# Patient Record
(demographics unavailable — no encounter records)

---

## 2025-02-13 NOTE — ASSESSMENT
[FreeTextEntry1] : # HM f/u AV labs Overdue for Pap smear, referred to GYN  # Frequent marijuana use Encouraged patient to switch to edible from inhaled  # Enlarged LN Stable, monitor for now  f/u in 1 year or PRN

## 2025-02-13 NOTE — PHYSICAL EXAM
[Normal TMs] : both tympanic membranes were normal [No Lymphadenopathy] : no lymphadenopathy [Supple] : supple [Normal] : normal rate, regular rhythm, normal S1 and S2 and no murmur heard [No Edema] : there was no peripheral edema [No Extremity Clubbing/Cyanosis] : no extremity clubbing/cyanosis [Soft] : abdomen soft [Non Tender] : non-tender [Non-distended] : non-distended [No Masses] : no abdominal mass palpated [Normal Supraclavicular Nodes] : no supraclavicular lymphadenopathy [Normal Anterior Cervical Nodes] : no anterior cervical lymphadenopathy [Normal Gait] : normal gait [Speech Grossly Normal] : speech grossly normal [Normal Affect] : the affect was normal [Alert and Oriented x3] : oriented to person, place, and time [Normal Mood] : the mood was normal [Normal Insight/Judgement] : insight and judgment were intact

## 2025-02-13 NOTE — HEALTH RISK ASSESSMENT
[2 - 4 times a month (2 pts)] : 2-4 times a month (2 points) [1 or 2 (0 pts)] : 1 or 2 (0 points) [Never (0 pts)] : Never (0 points) [Yes] : In the past 12 months have you used drugs other than those required for medical reasons? Yes [0] : 2) Feeling down, depressed, or hopeless: Not at all (0) [PHQ-2 Negative - No further assessment needed] : PHQ-2 Negative - No further assessment needed [Never] : Never [Patient reported PAP Smear was normal] : Patient reported PAP Smear was normal [HIV test declined] : HIV test declined [Hepatitis C test declined] : Hepatitis C test declined [Audit-CScore] : 2 [de-identified] : smokes marijuana, has edibles multiple times per week [de-identified] : exercises regularly, walks or runs a little, elliptical, bike, light weight lifting, beginner yoga [de-identified] : healthy [VBY7Agzzd] : 0 [PapSmearDate] : 02/23

## 2025-02-13 NOTE — HISTORY OF PRESENT ILLNESS
[FreeTextEntry1] : CPE [de-identified] : Pt comes in for CPE. Feeling well, no complaints.  Had enlarged LN of nbeck evaluated by head & neck surgeon last year, looked like reactive LN on imaging. It has not been changing in size.

## 2025-04-09 NOTE — PHYSICAL EXAM
[Normal] : no acute distress, well nourished, well developed and well-appearing [Normal Outer Ear/Nose] : the outer ears and nose were normal in appearance [No Respiratory Distress] : no respiratory distress  [No Extremity Clubbing/Cyanosis] : no extremity clubbing/cyanosis [No Spinal Tenderness] : no spinal tenderness [Normal Gait] : normal gait [Normal Affect] : the affect was normal [Alert and Oriented x3] : oriented to person, place, and time [Normal Insight/Judgement] : insight and judgment were intact [de-identified] : No pain on palpation of lower back b/l. Pt can't lay down due to pain. Prox muscle strength unable to assess due to pain.

## 2025-04-09 NOTE — HISTORY OF PRESENT ILLNESS
[FreeTextEntry8] : Pt comes in for eval of back pain. Started 5 days ago suddenly, has gotten significantly worse. Sitting, standing, walking very painful. If she moves wrong way in her sleep it wakes her up. It's never been this bad for this long. Pain not radiating down legs, but does feel like pain may sometimes go into buttock area b/l. Had sciatica years ago. She moved in January and since then it aggravated back pain, not this severe though. Has been taking tylenol and ibuprofen 400 mg, not helping.

## 2025-04-09 NOTE — ASSESSMENT
[FreeTextEntry1] : # Acute low back pain Trial of NSAID & muscle relaxant Advised not to take meloxicam with other NSAIDs Stay very well hydrated while on medication Advised muscle relaxant may make her feel drowsy Referred to PT If no improvement in pain would consider sending for imaging given severity of pain  f/u PRN

## 2025-05-22 NOTE — REVIEW OF SYSTEMS
[Negative] : Constitutional [de-identified] : nails are green, oozing from under them, + itching around them

## 2025-05-22 NOTE — ASSESSMENT
DATE OF SERVICE:  2018

 

SUBJECTIVE:  Ms. Nguyen underwent cardiac catheterization today.

 

Revealed that she has what appeared to be angiographically borderline lesion in the LAD, 60% with 30%
-40% distal lesion, right coronaries of 50% lesion.  It is not completely clear that this was the evangelist
rce of her symptoms.  So this morning, the stress test reports were not available, subsequently the s
tress only imaging has revealed some evidence of apical ischemia, but the resting scan was done later
 to see if that was reversible.  It was noted that the patient's flow improves really quite a bit aft
er intracoronary nitroglycerin indicating there may also be some microvascular angina.

 

After further discussion, we will like to do the followin.  Change antiplatelet drugs to Brilinta.

2.  Add Ranexa.

3.  Change to Vascepa as it was more effective lipid lowering and triglyceride lowering medicine.

4.  If symptoms persist, despite appropriate medical therapy could stent the LAD, but may cause angin
a by crossing a "diagonal."  The origin of the diagonal looks relatively free of disease; therefore, 
most likely will not cause a "pinch" in the artery but it is possible. _____ stent across the septal 
perforating artery.  The patient agrees with a trial of medical therapy prior to proceeding this sten
t implantation.  Released home tomorrow morning. [FreeTextEntry1] : # Onychomycosis Low suspicion of paronychia/felon given waxing/waning nature and presence over past few months, no severe pain. Recommended remove nail polish, properly sterilize or throw out home instruments she's been using for nails Trial of topical ciclopirox If no improvement I also sent in 6 weeks of oral terbinafine. Get liver function testing prison through for monitoring She will send in pictures for better visualization.  f/u PRN   Visit conducted as part of ongoing, longitudinal medical care for patient's medical and other issues.

## 2025-05-22 NOTE — PHYSICAL EXAM
[Normal] : no acute distress, well nourished, well developed and well-appearing [Normal Outer Ear/Nose] : the outer ears and nose were normal in appearance [No Respiratory Distress] : no respiratory distress  [Normal Affect] : the affect was normal [Alert and Oriented x3] : oriented to person, place, and time [Normal Insight/Judgement] : insight and judgment were intact [de-identified] : No vitals due to virtual visit [de-identified] : R pinky nail fold erythematous and swollen, R thumb pad swollen and red, mildly tender on palpation when patient presses on it.

## 2025-05-22 NOTE — HISTORY OF PRESENT ILLNESS
[Other Location: e.g. School (Enter Location, City,State)___] : at [unfilled], at the time of the visit. [Medical Office: ([facility])___] : at the medical office located in  [Telehealth (audio & video)] : This visit was provided via telehealth using real-time 2-way audio visual technology. [Verbal consent obtained from patient] : the patient, [unfilled] [FreeTextEntry8] : Pt comes in for eval of infection on fingers. Has been having intermittent fingernail fungus/infection. Comes and goes every few weeks for months. It's irritating. It is affecting R pinky and thumb. There's redness around nail fold and on tips of thumb and pinky on R hand. In last few weeks dry peeling skin. and some oozing from nails.